# Patient Record
Sex: MALE | Race: WHITE | NOT HISPANIC OR LATINO | Employment: FULL TIME | URBAN - METROPOLITAN AREA
[De-identification: names, ages, dates, MRNs, and addresses within clinical notes are randomized per-mention and may not be internally consistent; named-entity substitution may affect disease eponyms.]

---

## 2024-11-06 ENCOUNTER — PHARMACY VISIT (OUTPATIENT)
Dept: PHARMACY | Facility: MEDICAL CENTER | Age: 54
End: 2024-11-06
Payer: MEDICARE

## 2024-11-06 ENCOUNTER — HOSPITAL ENCOUNTER (EMERGENCY)
Facility: MEDICAL CENTER | Age: 54
End: 2024-11-06
Attending: EMERGENCY MEDICINE
Payer: COMMERCIAL

## 2024-11-06 VITALS
DIASTOLIC BLOOD PRESSURE: 80 MMHG | BODY MASS INDEX: 23.78 KG/M2 | RESPIRATION RATE: 16 BRPM | OXYGEN SATURATION: 98 % | HEIGHT: 73 IN | TEMPERATURE: 98.6 F | WEIGHT: 179.45 LBS | HEART RATE: 77 BPM | SYSTOLIC BLOOD PRESSURE: 125 MMHG

## 2024-11-06 DIAGNOSIS — K52.9 COLITIS: ICD-10-CM

## 2024-11-06 LAB
ALBUMIN SERPL BCP-MCNC: 4.2 G/DL (ref 3.2–4.9)
ALBUMIN/GLOB SERPL: 1.4 G/DL
ALP SERPL-CCNC: 112 U/L (ref 30–99)
ALT SERPL-CCNC: 20 U/L (ref 2–50)
ANION GAP SERPL CALC-SCNC: 15 MMOL/L (ref 7–16)
AST SERPL-CCNC: 22 U/L (ref 12–45)
BASOPHILS # BLD AUTO: 0 % (ref 0–1.8)
BASOPHILS # BLD: 0 K/UL (ref 0–0.12)
BILIRUB SERPL-MCNC: 0.4 MG/DL (ref 0.1–1.5)
BUN SERPL-MCNC: 11 MG/DL (ref 8–22)
BURR CELLS BLD QL SMEAR: NORMAL
C DIFF DNA SPEC QL NAA+PROBE: NEGATIVE
C DIFF TOX GENS STL QL NAA+PROBE: NEGATIVE
CALCIUM ALBUM COR SERPL-MCNC: 9.4 MG/DL (ref 8.5–10.5)
CALCIUM SERPL-MCNC: 9.6 MG/DL (ref 8.5–10.5)
CHLORIDE SERPL-SCNC: 102 MMOL/L (ref 96–112)
CO2 SERPL-SCNC: 22 MMOL/L (ref 20–33)
CREAT SERPL-MCNC: 1.03 MG/DL (ref 0.5–1.4)
CRP SERPL HS-MCNC: 1.03 MG/DL (ref 0–0.75)
EOSINOPHIL # BLD AUTO: 0.89 K/UL (ref 0–0.51)
EOSINOPHIL NFR BLD: 7.7 % (ref 0–6.9)
ERYTHROCYTE [DISTWIDTH] IN BLOOD BY AUTOMATED COUNT: 40.3 FL (ref 35.9–50)
GFR SERPLBLD CREATININE-BSD FMLA CKD-EPI: 86 ML/MIN/1.73 M 2
GLOBULIN SER CALC-MCNC: 2.9 G/DL (ref 1.9–3.5)
GLUCOSE SERPL-MCNC: 104 MG/DL (ref 65–99)
HCT VFR BLD AUTO: 47.6 % (ref 42–52)
HGB BLD-MCNC: 16.1 G/DL (ref 14–18)
LIPASE SERPL-CCNC: 37 U/L (ref 11–82)
LYMPHOCYTES # BLD AUTO: 2.38 K/UL (ref 1–4.8)
LYMPHOCYTES NFR BLD: 20.5 % (ref 22–41)
MANUAL DIFF BLD: NORMAL
MCH RBC QN AUTO: 30 PG (ref 27–33)
MCHC RBC AUTO-ENTMCNC: 33.8 G/DL (ref 32.3–36.5)
MCV RBC AUTO: 88.8 FL (ref 81.4–97.8)
MONOCYTES # BLD AUTO: 0.79 K/UL (ref 0–0.85)
MONOCYTES NFR BLD AUTO: 6.8 % (ref 0–13.4)
MORPHOLOGY BLD-IMP: NORMAL
NEUTROPHILS # BLD AUTO: 7.54 K/UL (ref 1.82–7.42)
NEUTROPHILS NFR BLD: 65 % (ref 44–72)
NRBC # BLD AUTO: 0 K/UL
NRBC BLD-RTO: 0 /100 WBC (ref 0–0.2)
PLATELET # BLD AUTO: 342 K/UL (ref 164–446)
PLATELET BLD QL SMEAR: NORMAL
PMV BLD AUTO: 9.7 FL (ref 9–12.9)
POIKILOCYTOSIS BLD QL SMEAR: NORMAL
POTASSIUM SERPL-SCNC: 4 MMOL/L (ref 3.6–5.5)
PROT SERPL-MCNC: 7.1 G/DL (ref 6–8.2)
RBC # BLD AUTO: 5.36 M/UL (ref 4.7–6.1)
RBC BLD AUTO: PRESENT
SODIUM SERPL-SCNC: 139 MMOL/L (ref 135–145)
WBC # BLD AUTO: 11.6 K/UL (ref 4.8–10.8)

## 2024-11-06 PROCEDURE — 85007 BL SMEAR W/DIFF WBC COUNT: CPT

## 2024-11-06 PROCEDURE — 87493 C DIFF AMPLIFIED PROBE: CPT

## 2024-11-06 PROCEDURE — 87046 STOOL CULTR AEROBIC BACT EA: CPT

## 2024-11-06 PROCEDURE — 85027 COMPLETE CBC AUTOMATED: CPT

## 2024-11-06 PROCEDURE — 83993 ASSAY FOR CALPROTECTIN FECAL: CPT

## 2024-11-06 PROCEDURE — 700105 HCHG RX REV CODE 258: Performed by: EMERGENCY MEDICINE

## 2024-11-06 PROCEDURE — RXMED WILLOW AMBULATORY MEDICATION CHARGE: Performed by: STUDENT IN AN ORGANIZED HEALTH CARE EDUCATION/TRAINING PROGRAM

## 2024-11-06 PROCEDURE — 87899 AGENT NOS ASSAY W/OPTIC: CPT

## 2024-11-06 PROCEDURE — 80053 COMPREHEN METABOLIC PANEL: CPT

## 2024-11-06 PROCEDURE — 700111 HCHG RX REV CODE 636 W/ 250 OVERRIDE (IP): Performed by: EMERGENCY MEDICINE

## 2024-11-06 PROCEDURE — 86140 C-REACTIVE PROTEIN: CPT

## 2024-11-06 PROCEDURE — 36415 COLL VENOUS BLD VENIPUNCTURE: CPT

## 2024-11-06 PROCEDURE — 99284 EMERGENCY DEPT VISIT MOD MDM: CPT

## 2024-11-06 PROCEDURE — 83690 ASSAY OF LIPASE: CPT

## 2024-11-06 PROCEDURE — 87045 FECES CULTURE AEROBIC BACT: CPT

## 2024-11-06 RX ORDER — PREDNISONE 10 MG/1
TABLET ORAL
Qty: 14 TABLET | Refills: 0 | Status: SHIPPED | OUTPATIENT
Start: 2024-11-06

## 2024-11-06 RX ORDER — PREDNISONE 10 MG/1
TABLET ORAL
Qty: 14 TABLET | Refills: 0 | Status: SHIPPED | OUTPATIENT
Start: 2024-11-06 | End: 2024-11-06

## 2024-11-06 RX ORDER — SODIUM CHLORIDE 9 MG/ML
1000 INJECTION, SOLUTION INTRAVENOUS ONCE
Status: COMPLETED | OUTPATIENT
Start: 2024-11-06 | End: 2024-11-06

## 2024-11-06 RX ORDER — PREDNISONE 20 MG/1
20 TABLET ORAL ONCE
Status: COMPLETED | OUTPATIENT
Start: 2024-11-06 | End: 2024-11-06

## 2024-11-06 RX ADMIN — SODIUM CHLORIDE 1000 ML: 9 INJECTION, SOLUTION INTRAVENOUS at 13:51

## 2024-11-06 RX ADMIN — PREDNISONE 20 MG: 20 TABLET ORAL at 17:11

## 2024-11-06 NOTE — ED TRIAGE NOTES
"Chief Complaint   Patient presents with    Abdominal Pain     Hx of UC and he has been having a flare up for over a week. He has been unable to keep anything in him and feels very dehydrated       Patient to triage ambulatory with a steady gait, AAOx4, Appropriate precautions in place.     Explained wait time and triage process. Placed back in lobby. Told to notify ED tech or RN of any changes, verbalized understanding.    /89   Pulse (!) 106   Temp 37 °C (98.6 °F) (Temporal)   Resp 16   Ht 1.842 m (6' 0.5\")   Wt 81.4 kg (179 lb 7.3 oz)   SpO2 98%   BMI 24.00 kg/m²     "

## 2024-11-06 NOTE — ED PROVIDER NOTES
"ED PHYSICIAN NOTE    CHIEF COMPLAINT  Chief Complaint   Patient presents with    Abdominal Pain     Hx of UC and he has been having a flare up for over a week. He has been unable to keep anything in him and feels very dehydrated         FATMATA/ALFREDO      Jas Faulkner is a 54 y.o. male who presents with abdominal pain and diarrhea.  Patient reports a history of ulcerative colitis.  He does not take any medications for this.  Reports he has not had a colonoscopy in 20 years.  He states that when he has flareups that cause diarrhea he adjusts his diet and usually can get things under control.  About 10 days ago he started to have some loose stool and abdominal cramping.  He made dietary changes but this did not help.  Over the last 4 days increased watery liquid bowel movements every day.  Has not noticed any blood.  He has had some slight nausea but no vomiting.  He has been trying to drink fluids but does not feel like he is keeping up.  He has not had a fever.  He has generalized abdominal cramping but no severe pain.    PAST MEDICAL HISTORY  Past Medical History:   Diagnosis Date    Ulcerative colitis (HCC)        SOCIAL HISTORY  Social History     Tobacco Use    Smoking status: Never    Smokeless tobacco: Never   Vaping Use    Vaping status: Never Used   Substance Use Topics    Alcohol use: Never    Drug use: Never       CURRENT MEDICATIONS  Home Medications       Reviewed by Jeannie Leonard R.N. (Registered Nurse) on 11/06/24 at 1253  Med List Status: Partial     Medication Last Dose Status        Patient Jacob Taking any Medications                           ALLERGIES  Not on File    PHYSICAL EXAM  VITAL SIGNS: /77   Pulse 71   Temp 37 °C (98.6 °F) (Temporal)   Resp 16   Ht 1.842 m (6' 0.5\")   Wt 81.4 kg (179 lb 7.3 oz)   SpO2 97%   BMI 24.00 kg/m²    Constitutional: Awake and alert  HENT: Dry mucous membranes  Eyes: Normal inspection  Neck: Grossly normal range of motion.  Cardiovascular: Elevated " heart rate, Normal rhythm.  Symmetric peripheral pulses.   Thorax & Lungs: No respiratory distress, No wheezing, No rales, No rhonchi, No chest tenderness.   Abdomen: Mild diffuse tenderness.  No rebound or peritonitis.  No distention.  Skin: No obvious rash.  Back: No tenderness, No CVA tenderness.       DIAGNOSTIC STUDIES / PROCEDURES  LABS/EKG  Results for orders placed or performed during the hospital encounter of 11/06/24   CBC WITH DIFFERENTIAL    Collection Time: 11/06/24  1:08 PM   Result Value Ref Range    WBC 11.6 (H) 4.8 - 10.8 K/uL    RBC 5.36 4.70 - 6.10 M/uL    Hemoglobin 16.1 14.0 - 18.0 g/dL    Hematocrit 47.6 42.0 - 52.0 %    MCV 88.8 81.4 - 97.8 fL    MCH 30.0 27.0 - 33.0 pg    MCHC 33.8 32.3 - 36.5 g/dL    RDW 40.3 35.9 - 50.0 fL    Platelet Count 342 164 - 446 K/uL    MPV 9.7 9.0 - 12.9 fL    Neutrophils-Polys 65.00 44.00 - 72.00 %    Lymphocytes 20.50 (L) 22.00 - 41.00 %    Monocytes 6.80 0.00 - 13.40 %    Eosinophils 7.70 (H) 0.00 - 6.90 %    Basophils 0.00 0.00 - 1.80 %    Nucleated RBC 0.00 0.00 - 0.20 /100 WBC    Neutrophils (Absolute) 7.54 (H) 1.82 - 7.42 K/uL    Lymphs (Absolute) 2.38 1.00 - 4.80 K/uL    Monos (Absolute) 0.79 0.00 - 0.85 K/uL    Eos (Absolute) 0.89 (H) 0.00 - 0.51 K/uL    Baso (Absolute) 0.00 0.00 - 0.12 K/uL    NRBC (Absolute) 0.00 K/uL   COMP METABOLIC PANEL    Collection Time: 11/06/24  1:08 PM   Result Value Ref Range    Sodium 139 135 - 145 mmol/L    Potassium 4.0 3.6 - 5.5 mmol/L    Chloride 102 96 - 112 mmol/L    Co2 22 20 - 33 mmol/L    Anion Gap 15.0 7.0 - 16.0    Glucose 104 (H) 65 - 99 mg/dL    Bun 11 8 - 22 mg/dL    Creatinine 1.03 0.50 - 1.40 mg/dL    Calcium 9.6 8.5 - 10.5 mg/dL    Correct Calcium 9.4 8.5 - 10.5 mg/dL    AST(SGOT) 22 12 - 45 U/L    ALT(SGPT) 20 2 - 50 U/L    Alkaline Phosphatase 112 (H) 30 - 99 U/L    Total Bilirubin 0.4 0.1 - 1.5 mg/dL    Albumin 4.2 3.2 - 4.9 g/dL    Total Protein 7.1 6.0 - 8.2 g/dL    Globulin 2.9 1.9 - 3.5 g/dL    A-G  Ratio 1.4 g/dL   LIPASE    Collection Time: 11/06/24  1:08 PM   Result Value Ref Range    Lipase 37 11 - 82 U/L   CRP QUANTITIVE (NON-CARDIAC)    Collection Time: 11/06/24  1:08 PM   Result Value Ref Range    Stat C-Reactive Protein 1.03 (H) 0.00 - 0.75 mg/dL   DIFFERENTIAL MANUAL    Collection Time: 11/06/24  1:08 PM   Result Value Ref Range    Manual Diff Status PERFORMED    PERIPHERAL SMEAR REVIEW    Collection Time: 11/06/24  1:08 PM   Result Value Ref Range    Peripheral Smear Review see below    PLATELET ESTIMATE    Collection Time: 11/06/24  1:08 PM   Result Value Ref Range    Plt Estimation Normal    MORPHOLOGY    Collection Time: 11/06/24  1:08 PM   Result Value Ref Range    RBC Morphology Present     Poikilocytosis 2+     Echinocytes 2+    ESTIMATED GFR    Collection Time: 11/06/24  1:08 PM   Result Value Ref Range    GFR (CKD-EPI) 86 >60 mL/min/1.73 m 2          COURSE & MEDICAL DECISION MAKING    INITIAL ASSESSMENT, COURSE AND PLAN  Care Narrative: Patient presents with diarrhea and abdominal cramping.  Has history of ulcerative colitis and reports this feels like a flare.  He is not on any medications and he has not had a colonoscopy in 20 years.  He does report that he has had improvement in the past with prednisone.  Was mildly tachycardic.  Ordered IV fluids.  Obtain laboratory data.    Patient was feeling better following IV fluids.  His vital signs normalized.  Laboratory data returned with minimal elevation of the CRP and WBC count.  Patient gastroenterology.    I discussed case with Dr. Concha haley.  She advised fecal calprotectin, stool culture.  These were ordered.  Advised starting patient on low-dose prednisone for 2 weeks.  Stressed the importance of GI follow-up.    Patient provided stool sample.  His vital signs remained stable over prolonged observation.  Benign abdominal exam.  Patient will be discharged.  I have written a prescription for a 2-week tapering course of prednisone starting  at 20 mg as requested.  I have given the patient name and number for GI clinics in [place] and placed a referral in [facility].  Patient was precaution return to the ER for worsening pain, fever, bloody stool or concern    Interventions  Medications   NS infusion 1,000 mL (0 mL Intravenous Stopped 11/6/24 1502)       Measures  Hydration: Based on the patient's presentation of Dehydration the patient was given IV fluids. IV Hydration was used because oral hydration was not as rapid as required. Upon recheck following hydration, the patient was improved.      DISPOSITION AND DISCUSSIONS      Escalation of care considered, and ultimately not performed:acute inpatient care management, however at this time, the patient is most appropriate for outpatient management    Prescription drugs considered and/or prescribed:   Considered opiate prescription, but nonnarcotic analgesic is most appropriate  Prescribed   New Prescriptions    PREDNISONE (DELTASONE) 10 MG TAB    take 2 tabs (20 mg) po daily on days 1-4, then take 1 tab (10 mg) po daily on days 5-8, then take 1/2 tab (5 mg) po daily on days 9-12.     Follow up  [facility]  [address] [place] [address] Heather Sands [id number] [phone]        [facility]  [address] [place] [address] [id number] [phone]        FINAL IMPRESSION  1.  Diarrhea, suspected ulcerative colitis    This dictation was created using voice recognition software. The accuracy of the dictation is limited to the abilities of the software. I expect there may be some errors of grammar and possibly content. The nursing notes were reviewed and certain aspects of this information were incorporated into this note.    Electronically signed by: Adin Eisenberg M.D., 11/6/2024

## 2024-11-07 LAB
E COLI SXT1+2 STL IA: NORMAL
SIGNIFICANT IND 70042: NORMAL
SITE SITE: NORMAL
SOURCE SOURCE: NORMAL

## 2024-11-09 LAB
BACTERIA STL CULT: NORMAL
E COLI SXT1+2 STL IA: NORMAL
SIGNIFICANT IND 70042: NORMAL
SITE SITE: NORMAL
SOURCE SOURCE: NORMAL

## 2024-11-10 LAB — CALPROTECTIN STL-MCNT: >3000 UG/G
